# Patient Record
Sex: FEMALE | Race: ASIAN | NOT HISPANIC OR LATINO | Employment: UNEMPLOYED | URBAN - METROPOLITAN AREA
[De-identification: names, ages, dates, MRNs, and addresses within clinical notes are randomized per-mention and may not be internally consistent; named-entity substitution may affect disease eponyms.]

---

## 2017-12-19 ENCOUNTER — OFFICE VISIT (OUTPATIENT)
Dept: OPHTHALMOLOGY | Facility: CLINIC | Age: 22
End: 2017-12-19
Attending: OPHTHALMOLOGY
Payer: OTHER GOVERNMENT

## 2017-12-19 DIAGNOSIS — M35.01 KERATITIS SICCA: Primary | ICD-10-CM

## 2017-12-19 PROCEDURE — 99212 OFFICE O/P EST SF 10 MIN: CPT | Mod: PBBFAC | Performed by: OPHTHALMOLOGY

## 2017-12-19 PROCEDURE — 99999 PR PBB SHADOW E&M-EST. PATIENT-LVL II: CPT | Mod: PBBFAC,,, | Performed by: OPHTHALMOLOGY

## 2017-12-19 PROCEDURE — 92012 INTRM OPH EXAM EST PATIENT: CPT | Mod: S$PBB,,, | Performed by: OPHTHALMOLOGY

## 2017-12-19 NOTE — PROGRESS NOTES
HPI     DLS: 05/31/2016 Dr. Paredes    Pt is 27 weeks pregnant.     Patient states she feels like her rx is stable in her glasses. Denies   flashes, floaters, diplopia, photophobia, glare, HA's, or pain.     Last edited by Kailyn Triana on 12/19/2017 10:08 AM. (History)            Assessment /Plan     For exam results, see Encounter Report.    Keratitis sicca      Updated MRx/CTLs given  AT's

## 2019-02-26 ENCOUNTER — OFFICE VISIT (OUTPATIENT)
Dept: OPHTHALMOLOGY | Facility: CLINIC | Age: 24
End: 2019-02-26
Attending: OPHTHALMOLOGY
Payer: OTHER GOVERNMENT

## 2019-02-26 DIAGNOSIS — M35.01 KERATITIS SICCA: Primary | ICD-10-CM

## 2019-02-26 DIAGNOSIS — H52.13 MYOPIA OF BOTH EYES: ICD-10-CM

## 2019-02-26 PROCEDURE — 99212 OFFICE O/P EST SF 10 MIN: CPT | Mod: PBBFAC | Performed by: OPHTHALMOLOGY

## 2019-02-26 PROCEDURE — 92012 PR EYE EXAM, EST PATIENT,INTERMED: ICD-10-PCS | Mod: ,,, | Performed by: OPHTHALMOLOGY

## 2019-02-26 PROCEDURE — 99999 PR PBB SHADOW E&M-EST. PATIENT-LVL II: CPT | Mod: PBBFAC,,, | Performed by: OPHTHALMOLOGY

## 2019-02-26 PROCEDURE — 99999 PR PBB SHADOW E&M-EST. PATIENT-LVL II: ICD-10-PCS | Mod: PBBFAC,,, | Performed by: OPHTHALMOLOGY

## 2019-02-26 PROCEDURE — 92012 INTRM OPH EXAM EST PATIENT: CPT | Mod: ,,, | Performed by: OPHTHALMOLOGY

## 2019-02-26 NOTE — PROGRESS NOTES
HPI     Pt is here today for annual exam.   Pt states that for the past few months she feels as though her distance   vision will get blurry while out at a store. Pt doesn't noticed blurry   vision when at home. No flashes or floaters OU. No pain or discomfort OU.   No use of eye drops. Pt states that every once in a while she feels as   though her eyes will go back and fourth for a split second like her eyes   are swinging back and fourth. Pt states it doesn't happen often but notice   when it does happen.         Last edited by Dorothy Das on 2/26/2019  2:24 PM. (History)            Assessment /Plan     For exam results, see Encounter Report.    Keratitis sicca    Myopia of both eyes      MRx given

## 2022-02-08 LAB
ANTIBODY SCREEN, EXTERNAL: NEGATIVE
CHLAMYDIA, EXTERNAL: NEGATIVE
HBSAG, EXTERNAL: NEGATIVE
HGB, EXTERNAL: 12.5
HIV, EXTERNAL: NEGATIVE
Lab: NORMAL
N. GONORRHEA, EXTERNAL: NEGATIVE
NIPT, EXTERNAL: NORMAL
PAP SMEAR, EXTERNAL: NORMAL
RPR, EXTERNAL: NEGATIVE
RUBELLA, EXTERNAL: NORMAL
TYPE, ABO & RH, EXTERNAL: NORMAL

## 2022-03-03 VITALS — HEIGHT: 63 IN

## 2022-03-03 DIAGNOSIS — E03.9 ACQUIRED HYPOTHYROIDISM: ICD-10-CM

## 2022-03-03 DIAGNOSIS — D56.3 BETA THALASSEMIA TRAIT: ICD-10-CM

## 2022-03-03 DIAGNOSIS — E55.9 VITAMIN D DEFICIENCY: ICD-10-CM

## 2022-03-03 NOTE — PROGRESS NOTES
Problems  Hypothyroid  Dating by 10wk us  NIPT XX   Girl   H/O PPROM/PTB 35w with G1  Vit D Def 24.5

## 2022-03-08 ENCOUNTER — INITIAL PRENATAL (OUTPATIENT)
Dept: OBGYN CLINIC | Age: 27
End: 2022-03-08
Payer: OTHER GOVERNMENT

## 2022-03-08 VITALS — BODY MASS INDEX: 29.62 KG/M2 | WEIGHT: 167.2 LBS | SYSTOLIC BLOOD PRESSURE: 118 MMHG | DIASTOLIC BLOOD PRESSURE: 81 MMHG

## 2022-03-08 DIAGNOSIS — Z3A.14 PREGNANCY WITH 14 COMPLETED WEEKS GESTATION: Primary | ICD-10-CM

## 2022-03-08 PROBLEM — O42.919 PRETERM PREMATURE RUPTURE OF MEMBRANES: Status: ACTIVE | Noted: 2018-02-20

## 2022-03-08 PROBLEM — O09.219 PREGNANCY WITH HISTORY OF PRE-TERM LABOR: Status: ACTIVE | Noted: 2022-03-08

## 2022-03-08 PROCEDURE — 0502F SUBSEQUENT PRENATAL CARE: CPT | Performed by: OBSTETRICS & GYNECOLOGY

## 2022-03-08 RX ORDER — ACETAMINOPHEN 500 MG
2000 TABLET ORAL DAILY
Qty: 30 CAPSULE | Refills: 5 | Status: SHIPPED | OUTPATIENT
Start: 2022-03-08 | End: 2022-09-04

## 2022-03-08 RX ORDER — LEVOTHYROXINE SODIUM 50 UG/1
TABLET ORAL
COMMUNITY
Start: 2022-02-22

## 2022-03-08 RX ORDER — PRENATAL WITH FERROUS FUM AND FOLIC ACID 3080; 920; 120; 400; 22; 1.84; 3; 20; 10; 1; 12; 200; 27; 25; 2 [IU]/1; [IU]/1; MG/1; [IU]/1; MG/1; MG/1; MG/1; MG/1; MG/1; MG/1; UG/1; MG/1; MG/1; MG/1; MG/1
TABLET ORAL
COMMUNITY
Start: 2022-01-07 | End: 2022-04-19 | Stop reason: ALTCHOICE

## 2022-03-08 NOTE — PROGRESS NOTES
Doing well No complaints.   Adv to take Vit D      Problems  Hypothyroid  Dating by 10wk us  NIPT XX   Girl   H/O PPROM/PTB 35w with G1  Vit D Def 24.5 -2000iu   Service Moving in 6/2022 [place]

## 2022-04-19 ENCOUNTER — ROUTINE PRENATAL (OUTPATIENT)
Dept: OBGYN CLINIC | Age: 27
End: 2022-04-19

## 2022-04-19 ENCOUNTER — OFFICE VISIT (OUTPATIENT)
Dept: OBGYN CLINIC | Age: 27
End: 2022-04-19

## 2022-04-19 VITALS — WEIGHT: 168 LBS | SYSTOLIC BLOOD PRESSURE: 130 MMHG | BODY MASS INDEX: 29.76 KG/M2 | DIASTOLIC BLOOD PRESSURE: 70 MMHG

## 2022-04-19 VITALS — SYSTOLIC BLOOD PRESSURE: 130 MMHG | DIASTOLIC BLOOD PRESSURE: 70 MMHG | WEIGHT: 168 LBS | BODY MASS INDEX: 29.76 KG/M2

## 2022-04-19 DIAGNOSIS — Z3A.20 20 WEEKS GESTATION OF PREGNANCY: Primary | ICD-10-CM

## 2022-04-19 DIAGNOSIS — O09.213 CURRENT PREGNANCY WITH HISTORY OF PRE-TERM LABOR IN THIRD TRIMESTER: ICD-10-CM

## 2022-04-19 PROCEDURE — 0502F SUBSEQUENT PRENATAL CARE: CPT | Performed by: OBSTETRICS & GYNECOLOGY

## 2022-04-19 NOTE — PROGRESS NOTES
Doing well US today normal growth (23%). Cervix looks good.       Problems  Hypothyroid   Dating by 10wk us  NIPT WNL  Girl   H/O PPROM/PTB 35w with G1  Vit D Def 24.5 2000iu   Service Moving in 6/2022 [facility]

## 2022-04-19 NOTE — PROGRESS NOTES
FETAL SURVEY  A SINGLE VIABLE IUP AT 20W1D GA BY LMP IS SEEN. FETAL CARDIAC MOTION OBSERVED. FETAL ANATOMY WELL VISUALIZED AND APPEARS WITHIN NORMAL LIMITS. NO ABNORMALITIES ARE SEEN ON TODAY'S EXAM.  FACE, NOSE/LIPS, PROFILE, CSP, LAT VENT, CER/CM, CP, SPINE, KIDNEYS, STOMACH/DIAPHRAGM, BLADDER, 3VC,  CORD INSERTION, RVOT, LVOT, 4CH, AO, DA, 3VV, ARMS, LEGS, HANDS, FEET. APPROPRIATE FETAL GROWTH IS SEEN. SIZE=DATES. AKILAH, CERVIX AND PLACENTA APPEAR WITHIN NORMAL LIMITS.   GENDER: XX

## 2022-05-17 ENCOUNTER — ROUTINE PRENATAL (OUTPATIENT)
Dept: OBGYN CLINIC | Age: 27
End: 2022-05-17
Payer: OTHER GOVERNMENT

## 2022-05-17 VITALS
BODY MASS INDEX: 30.72 KG/M2 | WEIGHT: 173.4 LBS | DIASTOLIC BLOOD PRESSURE: 70 MMHG | RESPIRATION RATE: 17 BRPM | SYSTOLIC BLOOD PRESSURE: 122 MMHG | HEIGHT: 63 IN

## 2022-05-17 DIAGNOSIS — Z3A.24 24 WEEKS GESTATION OF PREGNANCY: Primary | ICD-10-CM

## 2022-05-17 PROCEDURE — 0502F SUBSEQUENT PRENATAL CARE: CPT | Performed by: OBSTETRICS & GYNECOLOGY

## 2022-05-17 RX ORDER — CHOLECALCIFEROL (VITAMIN D3) 25 MCG
TABLET ORAL
COMMUNITY
Start: 2022-05-08

## 2022-05-17 NOTE — PROGRESS NOTES
Doing well. No issues. Occ [address]    Problems  Hypothyroid  Dating by 10wk us  NIPT WNL  Girl - name?   H/O PPROM/PTB 35w with G1  Vit D Def 24.5  US 20w1d = 19w4d 23% hadlock  Moving after 28w

## 2022-05-17 NOTE — PROGRESS NOTES
Room:     Identified pt with two pt identifiers(name and ). Reviewed record in preparation for visit and have obtained necessary documentation. All patient medications has been reviewed. No chief complaint on file.       Health Maintenance Due   Topic    Hepatitis C Screening        Vitals:    22 1311   BP: 122/70   Resp: 17   Weight: 173 lb 6.4 oz (78.7 kg)   Height: 5' 3\" (1.6 m)   PainSc:   0 - No pain   LMP: 10/26/2021

## 2022-06-14 ENCOUNTER — ROUTINE PRENATAL (OUTPATIENT)
Dept: OBGYN CLINIC | Age: 27
End: 2022-06-14
Payer: OTHER GOVERNMENT

## 2022-06-14 VITALS — WEIGHT: 176.4 LBS | SYSTOLIC BLOOD PRESSURE: 122 MMHG | BODY MASS INDEX: 31.25 KG/M2 | DIASTOLIC BLOOD PRESSURE: 77 MMHG

## 2022-06-14 DIAGNOSIS — E55.9 VITAMIN D DEFICIENCY: ICD-10-CM

## 2022-06-14 DIAGNOSIS — E03.9 ACQUIRED HYPOTHYROIDISM: ICD-10-CM

## 2022-06-14 DIAGNOSIS — Z3A.28 28 WEEKS GESTATION OF PREGNANCY: Primary | ICD-10-CM

## 2022-06-14 PROCEDURE — 0502F SUBSEQUENT PRENATAL CARE: CPT | Performed by: OBSTETRICS & GYNECOLOGY

## 2022-06-14 PROCEDURE — 90715 TDAP VACCINE 7 YRS/> IM: CPT | Performed by: OBSTETRICS & GYNECOLOGY

## 2022-06-14 PROCEDURE — 90471 IMMUNIZATION ADMIN: CPT | Performed by: OBSTETRICS & GYNECOLOGY

## 2022-06-14 NOTE — PROGRESS NOTES
Doing well. Ced horses at night. Fetus active  Glucola/Hgb/Vit D/TSH today    TDAP    Problems  Hypothyroid  Dating by 10wk us  NIPT WNL  Girl - name?   H/O PPROM/PTB 35w with G1  Vit D Def 24.5  US 20w1d = 19w4d 23% hadlock  Moving after 28w

## 2022-06-15 PROBLEM — R73.09 ELEVATED GLUCOSE TOLERANCE TEST: Status: ACTIVE | Noted: 2022-06-15

## 2022-06-15 LAB
ERYTHROCYTE [DISTWIDTH] IN BLOOD BY AUTOMATED COUNT: 14.4 % (ref 11.5–14.5)
GLUCOSE 1H P 100 G GLC PO SERPL-MCNC: 157 MG/DL (ref 65–140)
HCT VFR BLD AUTO: 36.6 % (ref 35–47)
HGB BLD-MCNC: 11.7 G/DL (ref 11.5–16)
MCH RBC QN AUTO: 28.2 PG (ref 26–34)
MCHC RBC AUTO-ENTMCNC: 32 G/DL (ref 30–36.5)
MCV RBC AUTO: 88.2 FL (ref 80–99)
NRBC # BLD: 0 K/UL (ref 0–0.01)
NRBC BLD-RTO: 0 PER 100 WBC
PLATELET # BLD AUTO: 247 K/UL (ref 150–400)
PMV BLD AUTO: 10.4 FL (ref 8.9–12.9)
RBC # BLD AUTO: 4.15 M/UL (ref 3.8–5.2)
WBC # BLD AUTO: 10.2 K/UL (ref 3.6–11)

## 2022-06-16 ENCOUNTER — LAB ONLY (OUTPATIENT)
Dept: OBGYN CLINIC | Age: 27
End: 2022-06-16

## 2022-06-16 DIAGNOSIS — R73.09 ELEVATED GLUCOSE TOLERANCE TEST: Primary | ICD-10-CM

## 2022-06-16 DIAGNOSIS — E03.9 ACQUIRED HYPOTHYROIDISM: ICD-10-CM

## 2022-06-16 DIAGNOSIS — Z3A.28 28 WEEKS GESTATION OF PREGNANCY: ICD-10-CM

## 2022-06-16 LAB
25(OH)D3 SERPL-MCNC: 32.9 NG/ML (ref 30–100)
GESTATIONAL 3HR GTT,GESTA: ABNORMAL
GLUCOSE 1H P 100 G GLC PO SERPL-MCNC: 179 MG/DL (ref 65–180)
GLUCOSE P FAST SERPL-MCNC: 85 MG/DL (ref 65–95)
GLUCOSE, 2 HR,GSTT2: 148 MG/DL (ref 65–155)
GLUCOSE, 3 HR,GSTT3: 142 MG/DL (ref 65–140)

## 2022-06-17 LAB — TSH SERPL-ACNC: 3.83 UIU/ML (ref 0.45–4.5)
